# Patient Record
Sex: FEMALE | Race: WHITE | NOT HISPANIC OR LATINO | URBAN - METROPOLITAN AREA
[De-identification: names, ages, dates, MRNs, and addresses within clinical notes are randomized per-mention and may not be internally consistent; named-entity substitution may affect disease eponyms.]

---

## 2024-06-28 ENCOUNTER — HOSPITAL ENCOUNTER (OUTPATIENT)
Dept: RADIOLOGY | Facility: CLINIC | Age: 29
Discharge: HOME | End: 2024-06-28
Payer: COMMERCIAL

## 2024-06-28 DIAGNOSIS — Z34.90 ENCOUNTER FOR SUPERVISION OF NORMAL PREGNANCY, UNSPECIFIED, UNSPECIFIED TRIMESTER (HHS-HCC): ICD-10-CM

## 2024-06-28 PROCEDURE — 76811 OB US DETAILED SNGL FETUS: CPT

## 2024-08-15 ENCOUNTER — INITIAL PRENATAL (OUTPATIENT)
Dept: MATERNAL FETAL MEDICINE | Facility: CLINIC | Age: 29
End: 2024-08-15
Payer: COMMERCIAL

## 2024-08-15 VITALS — BODY MASS INDEX: 37.88 KG/M2 | WEIGHT: 264 LBS | DIASTOLIC BLOOD PRESSURE: 69 MMHG | SYSTOLIC BLOOD PRESSURE: 111 MMHG

## 2024-08-15 DIAGNOSIS — O24.414 INSULIN CONTROLLED GESTATIONAL DIABETES MELLITUS (GDM) IN SECOND TRIMESTER (HHS-HCC): ICD-10-CM

## 2024-08-15 DIAGNOSIS — Z3A.27 27 WEEKS GESTATION OF PREGNANCY (HHS-HCC): Primary | ICD-10-CM

## 2024-08-15 PROCEDURE — 99215 OFFICE O/P EST HI 40 MIN: CPT | Performed by: OBSTETRICS & GYNECOLOGY

## 2024-08-15 RX ORDER — PEN NEEDLE, DIABETIC 30 GX3/16"
NEEDLE, DISPOSABLE MISCELLANEOUS
Qty: 200 EACH | Refills: 3 | Status: SHIPPED | OUTPATIENT
Start: 2024-08-15 | End: 2024-08-15

## 2024-08-15 RX ORDER — PEN NEEDLE, DIABETIC 30 GX3/16"
NEEDLE, DISPOSABLE MISCELLANEOUS
Qty: 100 EACH | Refills: 3 | Status: SHIPPED | OUTPATIENT
Start: 2024-08-15

## 2024-08-15 RX ORDER — INSULIN HUMAN 100 [IU]/ML
10 INJECTION, SUSPENSION SUBCUTANEOUS NIGHTLY
Qty: 15 ML | Refills: 3 | Status: SHIPPED | OUTPATIENT
Start: 2024-08-15

## 2024-08-15 RX ORDER — ISOPROPYL ALCOHOL 70 ML/100ML
SWAB TOPICAL
Qty: 100 EACH | Refills: 3 | Status: SHIPPED | OUTPATIENT
Start: 2024-08-15

## 2024-08-15 RX ORDER — ONDANSETRON HYDROCHLORIDE 8 MG/1
8 TABLET, FILM COATED ORAL EVERY 8 HOURS PRN
COMMUNITY
Start: 2024-03-22

## 2024-08-15 NOTE — LETTER
August 15, 2024     Wilber Rocha MD  [address].  [facility] [address]    Patient: Jin Castanon   YOB: 1995   Date of Visit: 8/15/2024       Dear Dr. Wilber Rocha MD:    Thank you for referring Jin Castanon to me for evaluation. Below are my notes for this consultation.  If you have questions, please do not hesitate to call me. I look forward to following your patient along with you.       Sincerely,     Danielito Rubio MD      CC: No Recipients  ______________________________________________________________________________________    8/15/2024   Jin Castanon     MFM CONSULT NOTE  Referring Clinician: Wilber Rocha  Reason for consult: GDM    HPI: Jin Castanon is a 29 y.o.  at 27w6d here for consult for GDM.     Doing well today without acute complaints.  Had GDM in last pregnancy that was diet controlled, underwent IOL at 38 weeks for suspected fetal macrosomia.  This pregnancy had an early elevated 1 hr GDM screen and has been checking BG.  PP values all at goal, fating BG persistently 100s despite bedtime snack.    10 point review of system is negative except as above    OB History  OB History    Para Term  AB Living   2 1 1 0 0 1   SAB IAB Ectopic Multiple Live Births   0 0 0 0 1      # Outcome Date GA Lbr Lloyd/2nd Weight Sex Type Anes PTL Lv   2 Current            1 Term  38w4d  4.082 kg F Vag-Spont   SHADI      Complications: GDM, class A1 ([facility])       Medical History  History reviewed. No pertinent past medical history.    Surgical History  History reviewed. No pertinent surgical history.    Family History  family history includes Diabetes type II in her maternal grandfather, maternal grandmother, paternal grandfather, and paternal grandmother.    Social History  Social History     Tobacco Use   • Smoking status: Never   • Smokeless tobacco: Never   Substance Use Topics   • Alcohol use: Not Currently     Comment: Prior to pregnancy  "  • Drug use: Never       Allergies  No Known Allergies    Medications:  Medication Documentation Review Audit       Reviewed by Danielito Rubio MD (Physician) on 08/15/24 at 1059      Medication Order Taking? Sig Documenting Provider Last Dose Status   alcohol swabs pads, medicated [id number]  Use 1, up to 5 times a day Danielito Rubio MD  Active   insulin NPH, Isophane, (HumuLIN N NPH Insulin KwikPen) 100 unit/mL (3 mL) injection [id number]  Inject 10 Units under the skin once daily at bedtime. Inject  May increase to 50 units total per day during pregnancy as needed. Danielito Rubio MD  Active   ondansetron (Zofran) 8 mg tablet [id number] Yes Take 1 tablet (8 mg) by mouth every 8 hours if needed for nausea or vomiting. Historical Provider, MD  Active   pen needle, diabetic (Pen Needle) 32 gauge x /32\" needle [id number]  Use 1 per injection, up to 5 times a day Danielito Rubio MD  Active                    OBJECTIVE  Visit Vitals  /69   Wt 120 kg (264 lb)   LMP 2024   BMI 37.88 kg/m²   OB Status Pregnant   Smoking Status Never   BSA 2.43 m²       Physical exam  Gen: NAD  HEENT: EOMI, CN2-12 intact  Pulm: non-labored    ASSESSMENT & PLAN    Jin Castanon is a 29 y.o.  at 27w6d here for the followin. A2DM  Patient was recently diagnosed with gestational diabetes (GDM).  We discussed the pregnancy implications of the diagnosis including increased risk of pre-eclampsia, LGA/macrosomia, shoulder dystocia, stillbirth as well as  hypoglycemia, hyperbilirubinemia and respiratory distress.  We reviewed the importance of glycemic control and its impact on lowering these risks.  Discussed management ranging from dietary changes to pharmacotherapy and that insulin is considered first line therapy rather than oral agents if medication is ultimately needed.  Discussed our recommendation for serial growth ultrasounds and starting  testing at 32 weeks if treatment is required.  " We also stressed the potential persistence of impaired glucose tolerance post pregnancy in up to 30% of patients and 50% risk of IGT/T2DM in the next 10 years with an overall lifetime risk of T2DM of 70%. We reviewed the recommendation for postpartum screening at 6 weeks post-partum (can be performed as soon as 2 days after delivery) and, if normal, routine screening every 1-3 years. We did discuss that a healthy diet and maintenance of a normal body weight may reduce those risks  - Has growth US scheduled in 2 weeks  - BG reviewed and will start NPH 10 units before bedtime today.    In summary the following is recommended:    1. We will continue to co-manage diabetes via the Bloodsugar line with weekly assessment of glycemic control.  Current regimen is: NPH 10 units before bedtime.  2. We will plan for a follow up MD visit at 35-36 weeks to assess overall control and provide delivery timing recommendations.  3. Recommend serial growth ultrasounds every 4-6 weeks starting at 28 weeks gestation.  4. Weekly  testing is recommended starting at 32 weeks Twice weekly testing is recommended at 32 weeks if control is suboptimal, there is polyhydramnios, or  there is a LGA growth pattern.  5. Delivery is recommended at 39 weeks, though if glycemic control is suboptimal then delivery at 37-39 weeks may be considered.  6. If the EFW is >4500g at the time of delivery  should be considered.  7. A 2hr GTT is recommended 6 weeks postpartum (can be performed as soon as 2 days postpartum), if normal then screening for T2DM is recommended at least every 3 years.    Thank you for allowing us to participate in the care of your patient.     I spent 60 minutes in the professional and overall care of this patient.    Danielito Rubio MD  Maternal Fetal Medicine

## 2024-08-15 NOTE — PROGRESS NOTES
Insulin Pen Education     Able to demonstrate/describe:     Wash hands      Check label - verify correct medication   Gentle mix (NPH)   Attach new needle each time   Safety test (prime pen, ensure needle working properly)    Select correct dose   Self injection   Site selection & rotation   Remove needle   Insulin Storage   Needle disposal    Review Hypoglycemia    Causes   Sign & symptoms   Oral Treatment    Patient appears to have good understanding and is engaged in self-care.  Encouraged to call for questions or concerns    Given printed education materials  Plans f/u by phone 2-3 days and then weekly for BGM support.    Has log sheets and/or contact information.

## 2024-08-15 NOTE — PROGRESS NOTES
8/15/2024   Jin Castanon     [facility] CONSULT NOTE  Referring Clinician: Wilber Rocha  Reason for consult: GDM    HPI: Jin Castanon is a 29 y.o.  at 27w6d here for consult for GDM.     Doing well today without acute complaints.  Had GDM in last pregnancy that was diet controlled, underwent IOL at 38 weeks for suspected fetal macrosomia.  This pregnancy had an early elevated 1 hr GDM screen and has been checking BG.  PP values all at goal, fating BG persistently 100s despite bedtime snack.    10 point review of system is negative except as above    OB History  OB History    Para Term  AB Living   2 1 1 0 0 1   SAB IAB Ectopic Multiple Live Births   0 0 0 0 1      # Outcome Date GA Lbr Lloyd/2nd Weight Sex Type Anes PTL Lv   2 Current            1 Term  38w4d  4.082 kg F Vag-Spont   SHADI      Complications: GDM, class A1 ([facility])       Medical History  History reviewed. No pertinent past medical history.    Surgical History  History reviewed. No pertinent surgical history.    Family History  family history includes Diabetes type II in her maternal grandfather, maternal grandmother, paternal grandfather, and paternal grandmother.    Social History  Social History     Tobacco Use    Smoking status: Never    Smokeless tobacco: Never   Substance Use Topics    Alcohol use: Not Currently     Comment: Prior to pregnancy    Drug use: Never       Allergies  No Known Allergies    Medications:  Medication Documentation Review Audit       Reviewed by Danielito Rubio MD (Physician) on 08/15/24 at 1059      Medication Order Taking? Sig Documenting Provider Last Dose Status   alcohol swabs pads, medicated [id number]  Use 1, up to 5 times a day Danielito Rubio MD  Active   insulin NPH, Isophane, (HumuLIN N NPH Insulin KwikPen) 100 unit/mL (3 mL) injection [id number]  Inject 10 Units under the skin once daily at bedtime. Inject  May increase to 50 units total per day during pregnancy as needed. Danielito SMITH  "MD Ramiro  Active   ondansetron (Zofran) 8 mg tablet [id number] Yes Take 1 tablet (8 mg) by mouth every 8 hours if needed for nausea or vomiting. Historical Provider, MD  Active   pen needle, diabetic (Pen Needle) 32 gauge x \" needle [id number]  Use 1 per injection, up to 5 times a day Danielito Rubio MD  Active                    OBJECTIVE  Visit Vitals  /69   Wt 120 kg (264 lb)   LMP 2024   BMI 37.88 kg/m²   OB Status Pregnant   Smoking Status Never   BSA 2.43 m²       Physical exam  Gen: NAD  HEENT: EOMI, CN2-12 intact  Pulm: non-labored    ASSESSMENT & PLAN    Jin Castanon is a 29 y.o.  at 27w6d here for the followin. A2DM  Patient was recently diagnosed with gestational diabetes (GDM).  We discussed the pregnancy implications of the diagnosis including increased risk of pre-eclampsia, LGA/macrosomia, shoulder dystocia, stillbirth as well as  hypoglycemia, hyperbilirubinemia and respiratory distress.  We reviewed the importance of glycemic control and its impact on lowering these risks.  Discussed management ranging from dietary changes to pharmacotherapy and that insulin is considered first line therapy rather than oral agents if medication is ultimately needed.  Discussed our recommendation for serial growth ultrasounds and starting  testing at 32 weeks if treatment is required.  We also stressed the potential persistence of impaired glucose tolerance post pregnancy in up to 30% of patients and 50% risk of IGT/T2DM in the next 10 years with an overall lifetime risk of T2DM of 70%. We reviewed the recommendation for postpartum screening at 6 weeks post-partum (can be performed as soon as 2 days after delivery) and, if normal, routine screening every 1-3 years. We did discuss that a healthy diet and maintenance of a normal body weight may reduce those risks  - Has growth US scheduled in 2 weeks  - BG reviewed and will start NPH 10 units before bedtime today.    In " summary the following is recommended:    1. We will continue to co-manage diabetes via the Bloodsugar line with weekly assessment of glycemic control.  Current regimen is: NPH 10 units before bedtime.  2. We will plan for a follow up MD visit at 35-36 weeks to assess overall control and provide delivery timing recommendations.  3. Recommend serial growth ultrasounds every 4-6 weeks starting at 28 weeks gestation.  4. Weekly  testing is recommended starting at 32 weeks Twice weekly testing is recommended at 32 weeks if control is suboptimal, there is polyhydramnios, or  there is a LGA growth pattern.  5. Delivery is recommended at 39 weeks, though if glycemic control is suboptimal then delivery at 37-39 weeks may be considered.  6. If the EFW is >4500g at the time of delivery  should be considered.  7. A 2hr GTT is recommended 6 weeks postpartum (can be performed as soon as 2 days postpartum), if normal then screening for T2DM is recommended at least every 3 years.    Thank you for allowing us to participate in the care of your patient.     I spent 60 minutes in the professional and overall care of this patient.    Danielito Rubio MD  Maternal Fetal Medicine

## 2024-08-21 ENCOUNTER — PATIENT MESSAGE (OUTPATIENT)
Dept: MATERNAL FETAL MEDICINE | Facility: CLINIC | Age: 29
End: 2024-08-21
Payer: COMMERCIAL

## 2024-08-21 PROBLEM — O24.414 INSULIN CONTROLLED GESTATIONAL DIABETES MELLITUS (GDM) IN THIRD TRIMESTER (HHS-HCC): Status: ACTIVE | Noted: 2024-08-21

## 2024-08-28 ENCOUNTER — PATIENT MESSAGE (OUTPATIENT)
Dept: MATERNAL FETAL MEDICINE | Facility: CLINIC | Age: 29
End: 2024-08-28
Payer: COMMERCIAL

## 2024-08-30 ENCOUNTER — HOSPITAL ENCOUNTER (OUTPATIENT)
Dept: RADIOLOGY | Facility: CLINIC | Age: 29
Discharge: HOME | End: 2024-08-30
Payer: COMMERCIAL

## 2024-08-30 DIAGNOSIS — Z34.90 ENCOUNTER FOR SUPERVISION OF NORMAL PREGNANCY, UNSPECIFIED, UNSPECIFIED TRIMESTER (HHS-HCC): ICD-10-CM

## 2024-08-30 DIAGNOSIS — O36.5930 MATERNAL CARE FOR OTHER KNOWN OR SUSPECTED POOR FETAL GROWTH, THIRD TRIMESTER, NOT APPLICABLE OR UNSPECIFIED (HHS-HCC): ICD-10-CM

## 2024-08-30 PROCEDURE — 76816 OB US FOLLOW-UP PER FETUS: CPT

## 2024-08-30 PROCEDURE — 76819 FETAL BIOPHYS PROFIL W/O NST: CPT

## 2024-09-04 ENCOUNTER — PATIENT MESSAGE (OUTPATIENT)
Dept: MATERNAL FETAL MEDICINE | Facility: CLINIC | Age: 29
End: 2024-09-04
Payer: COMMERCIAL

## 2024-09-11 ENCOUNTER — PATIENT MESSAGE (OUTPATIENT)
Dept: MATERNAL FETAL MEDICINE | Facility: CLINIC | Age: 29
End: 2024-09-11
Payer: COMMERCIAL

## 2024-09-18 ENCOUNTER — PATIENT MESSAGE (OUTPATIENT)
Dept: MATERNAL FETAL MEDICINE | Facility: CLINIC | Age: 29
End: 2024-09-18
Payer: COMMERCIAL

## 2024-09-25 ENCOUNTER — PATIENT MESSAGE (OUTPATIENT)
Dept: MATERNAL FETAL MEDICINE | Facility: CLINIC | Age: 29
End: 2024-09-25
Payer: COMMERCIAL

## 2024-09-26 NOTE — PROGRESS NOTES
Subjective   Patient ID [id number]   Jin Castanon is a 29 y.o.  at 34w0d seen in follow up consultation for A2GDM. She had no obstetric complaints.     Mrs. Castanon has been reporting her blood sugars into our system weekly and brought a log in today which was reviewed. She utilizes 18 units of NPH in the evening, recently her blood sugars have been under good control.     Mrs. Castanon had GDM in her prior pregnancy which was controlled with dietary modification. Her last ultrasound biometry prior to delivery at 38 weeks was 89%, though the final birthweight was increased at 9+ pounds. She had an uncomplicated delivery at that birthweight without shoulder dystocia. There were no  GDM manifestations.     An ultrasound was obtained prior to our consultation with increased fetal biometry (> 95%), normal NANI/BPP.      Objective   Visit Vitals  /72      Physical Exam  Weight: 122 kg (269 lb)  BP: 113/72  Fetal Heart Rate: US      Assessment/Plan     I reviewed the increased fetal ultrasound biometry in the context of her history. I reviewed the limitation of ultrasound and margin of error of weight estimates. As her blood sugars we controlled and she had a prior larger delivery in the context of good GDM control it is possible that the biometry reflects biologic variance rather than hyperglycemia, especially as her own birthweight was > 10 pounds and her FOB is a larger individual. However, given the combination of A2GDM and increased biometry as association between the two is also difficult to rule out.     I reviewed delivery timing. The increased biometry and history of larger birthweight, pending any additional changes delivery would be reasonable at 37+0-6. Follow up ultrasound for biometry was scheduled for three weeks and she reports that fetal monitoring is scheduled in her provider's office. I reviewed early term delivery, including the risk of nursery admission or RDS. Blood sugar  monitoring can be discontinued after delivery. I reviewed the need for post-partum TIIDM screening and the increased lifetime risk of diabetes, including the long term need to follow with a PCP provider and preventative lifestyle strategies given the occurrence of GDM in two pregnancies. Of note she does not currently have a PCP though will look into establishing for after delivery.     Eugenio Garner MD   Maternal Fetal Medicine

## 2024-09-27 ENCOUNTER — ROUTINE PRENATAL (OUTPATIENT)
Dept: MATERNAL FETAL MEDICINE | Facility: CLINIC | Age: 29
End: 2024-09-27
Payer: COMMERCIAL

## 2024-09-27 ENCOUNTER — HOSPITAL ENCOUNTER (OUTPATIENT)
Dept: RADIOLOGY | Facility: CLINIC | Age: 29
Discharge: HOME | End: 2024-09-27
Payer: COMMERCIAL

## 2024-09-27 VITALS — DIASTOLIC BLOOD PRESSURE: 72 MMHG | WEIGHT: 269 LBS | BODY MASS INDEX: 38.6 KG/M2 | SYSTOLIC BLOOD PRESSURE: 113 MMHG

## 2024-09-27 DIAGNOSIS — O24.414 INSULIN CONTROLLED GESTATIONAL DIABETES MELLITUS (GDM) IN THIRD TRIMESTER (HHS-HCC): ICD-10-CM

## 2024-09-27 DIAGNOSIS — Z34.90 ENCOUNTER FOR SUPERVISION OF NORMAL PREGNANCY, UNSPECIFIED, UNSPECIFIED TRIMESTER: ICD-10-CM

## 2024-09-27 PROCEDURE — 99215 OFFICE O/P EST HI 40 MIN: CPT | Performed by: OBSTETRICS & GYNECOLOGY

## 2024-09-27 PROCEDURE — 99215 OFFICE O/P EST HI 40 MIN: CPT | Mod: 25 | Performed by: OBSTETRICS & GYNECOLOGY

## 2024-09-27 PROCEDURE — 76819 FETAL BIOPHYS PROFIL W/O NST: CPT

## 2024-09-27 PROCEDURE — 76816 OB US FOLLOW-UP PER FETUS: CPT

## 2024-10-02 ENCOUNTER — PATIENT MESSAGE (OUTPATIENT)
Dept: MATERNAL FETAL MEDICINE | Facility: CLINIC | Age: 29
End: 2024-10-02
Payer: COMMERCIAL

## 2024-10-04 ENCOUNTER — APPOINTMENT (OUTPATIENT)
Dept: RADIOLOGY | Facility: CLINIC | Age: 29
End: 2024-10-04
Payer: COMMERCIAL

## 2024-10-09 ENCOUNTER — PATIENT MESSAGE (OUTPATIENT)
Dept: MATERNAL FETAL MEDICINE | Facility: CLINIC | Age: 29
End: 2024-10-09
Payer: COMMERCIAL

## 2024-10-16 ENCOUNTER — PATIENT MESSAGE (OUTPATIENT)
Dept: MATERNAL FETAL MEDICINE | Facility: CLINIC | Age: 29
End: 2024-10-16
Payer: COMMERCIAL

## 2024-10-18 ENCOUNTER — HOSPITAL ENCOUNTER (OUTPATIENT)
Dept: RADIOLOGY | Facility: CLINIC | Age: 29
Discharge: HOME | End: 2024-10-18
Payer: COMMERCIAL

## 2024-10-18 DIAGNOSIS — Z34.90 ENCOUNTER FOR SUPERVISION OF NORMAL PREGNANCY, UNSPECIFIED, UNSPECIFIED TRIMESTER: ICD-10-CM

## 2024-10-18 PROCEDURE — 76819 FETAL BIOPHYS PROFIL W/O NST: CPT

## 2024-10-18 PROCEDURE — 76816 OB US FOLLOW-UP PER FETUS: CPT

## 2026-01-07 ENCOUNTER — APPOINTMENT (OUTPATIENT)
Dept: PRIMARY CARE | Facility: CLINIC | Age: 31
End: 2026-01-07
Payer: COMMERCIAL

## 2026-02-17 ENCOUNTER — APPOINTMENT (OUTPATIENT)
Dept: DERMATOLOGY | Facility: CLINIC | Age: 31
End: 2026-02-17
Payer: COMMERCIAL